# Patient Record
Sex: MALE | Race: WHITE | URBAN - METROPOLITAN AREA
[De-identification: names, ages, dates, MRNs, and addresses within clinical notes are randomized per-mention and may not be internally consistent; named-entity substitution may affect disease eponyms.]

---

## 2022-09-12 ENCOUNTER — OFFICE VISIT (OUTPATIENT)
Dept: URBAN - METROPOLITAN AREA CLINIC 12 | Facility: CLINIC | Age: 47
End: 2022-09-12
Payer: COMMERCIAL

## 2022-09-12 ENCOUNTER — WEB ENCOUNTER (OUTPATIENT)
Dept: URBAN - METROPOLITAN AREA CLINIC 12 | Facility: CLINIC | Age: 47
End: 2022-09-12

## 2022-09-12 VITALS
WEIGHT: 173.4 LBS | TEMPERATURE: 97.3 F | DIASTOLIC BLOOD PRESSURE: 77 MMHG | SYSTOLIC BLOOD PRESSURE: 130 MMHG | HEART RATE: 78 BPM | BODY MASS INDEX: 25.68 KG/M2 | HEIGHT: 69 IN

## 2022-09-12 DIAGNOSIS — F10.10 ALCOHOL ABUSE: ICD-10-CM

## 2022-09-12 DIAGNOSIS — R74.8 ELEVATED LIVER ENZYMES: ICD-10-CM

## 2022-09-12 PROCEDURE — 99244 OFF/OP CNSLTJ NEW/EST MOD 40: CPT | Performed by: STUDENT IN AN ORGANIZED HEALTH CARE EDUCATION/TRAINING PROGRAM

## 2022-09-12 PROCEDURE — 99204 OFFICE O/P NEW MOD 45 MIN: CPT | Performed by: STUDENT IN AN ORGANIZED HEALTH CARE EDUCATION/TRAINING PROGRAM

## 2022-09-12 NOTE — HPI-TODAY'S VISIT:
The patient was referred by Dr. Franny Antonio for elevated liver enzymes.   A copy of this document is being forwarded to the referring provider. 46 yo M with h/o alcohol abuse presenting for evaluation of elevated liver enzymes. Labs reviewed -- 8/4/22 -- AST 55, ALT 81, Alk phos 95, TB 0.3. Says that he drinks 4 drinks of vodka per day, 2-3 shots per drink -- 8-12 shots vodka total per day.  He is scheduled for abdominal US tomorrow. Denies h/o IVDU. Denies jaundice, icterus, abd swelling, leg swelling, easy bruising/bleeding.

## 2022-09-15 LAB
A/G RATIO: 1.6
ABSOLUTE BASOPHILS: 63
ABSOLUTE EOSINOPHILS: 419
ABSOLUTE LYMPHOCYTES: 1849
ABSOLUTE MONOCYTES: 387
ABSOLUTE NEUTROPHILS: 5182
ACTIN (SMOOTH MUSCLE) ANTIBODY (IGG): 23
ALBUMIN: 4.7
ALKALINE PHOSPHATASE: 84
ALPHA-1-ANTITRYPSIN QN: 142
ALT (SGPT): 89
ANA SCREEN, IFA: POSITIVE
AST (SGOT): 66
BASOPHILS: 0.8
BILIRUBIN, TOTAL: 0.6
BUN/CREATININE RATIO: (no result)
BUN: 12
CALCIUM: 10.1
CARBON DIOXIDE, TOTAL: 23
CERULOPLASMIN: 27
CHLORIDE: 104
CHOL/HDLC RATIO: 4.4
CHOLESTEROL, TOTAL: 190
CREATININE: 1.02
DNA (DS) ANTIBODY: 2
EGFR: 91
EOSINOPHILS: 5.3
FERRITIN, SERUM: 454
GLOBULIN, TOTAL: 2.9
GLUCOSE: 94
HDL CHOLESTEROL: 43
HEMATOCRIT: 48.3
HEMOGLOBIN: 16.7
HEPATITIS A AB, TOTAL: (no result)
HEPATITIS B SURFACE AB IMMUNITY, QN: <5
HEPATITIS B SURFACE ANTIGEN: (no result)
HEPATITIS C ANTIBODY: (no result)
IMMUNOGLOBULIN A: 299
IMMUNOGLOBULIN G: 1316
IMMUNOGLOBULIN M: 95
INDEX: 0.04
INR: 0.9
IRON BIND.CAP.(TIBC): 344
IRON SATURATION: 39
IRON: 133
LDL CHOLESTEROL CALC: 106
LYMPHOCYTES: 23.4
MCH: 32.5
MCHC: 34.6
MCV: 94
MONOCYTES: 4.9
MPV: 10.8
NEUTROPHILS: 65.6
NON HDL CHOLESTEROL: 147
PLATELET COUNT: 211
POTASSIUM: 4.4
PROTEIN, TOTAL: 7.6
PT: 9.8
RDW: 12.4
RED BLOOD CELL COUNT: 5.14
SCL-70 ANTIBODY: (no result)
SJOGREN'S ANTIBODY (SS-A): (no result)
SJOGREN'S ANTIBODY (SS-B): (no result)
SM ANTIBODY: (no result)
SM/RNP ANTIBODY: (no result)
SODIUM: 139
TRIGLYCERIDES: 306
WHITE BLOOD CELL COUNT: 7.9

## 2022-09-18 ENCOUNTER — TELEPHONE ENCOUNTER (OUTPATIENT)
Dept: URBAN - METROPOLITAN AREA CLINIC 92 | Facility: CLINIC | Age: 47
End: 2022-09-18

## 2022-09-22 ENCOUNTER — DASHBOARD ENCOUNTERS (OUTPATIENT)
Age: 47
End: 2022-09-22

## 2022-09-22 PROBLEM — 15167005: Status: ACTIVE | Noted: 2022-09-12
